# Patient Record
Sex: MALE | Race: WHITE | NOT HISPANIC OR LATINO | URBAN - METROPOLITAN AREA
[De-identification: names, ages, dates, MRNs, and addresses within clinical notes are randomized per-mention and may not be internally consistent; named-entity substitution may affect disease eponyms.]

---

## 2020-02-25 PROBLEM — 238131007 OVERWEIGHT: Status: ACTIVE | Noted: 2020-02-25

## 2022-04-30 ENCOUNTER — TELEPHONE ENCOUNTER (OUTPATIENT)
Dept: URBAN - METROPOLITAN AREA CLINIC 121 | Facility: CLINIC | Age: 72
End: 2022-04-30

## 2022-04-30 RX ORDER — ASPIRIN 81 MG
TABLET, DELAYED RELEASE (ENTERIC COATED) ORAL
OUTPATIENT
Start: 2009-04-03 | End: 2014-06-30

## 2022-04-30 RX ORDER — BUDESONIDE 3 MG/1
TAKE 3 CAPSULES PO WITH FOOD A AM CAPSULE, COATED PELLETS ORAL
OUTPATIENT
Start: 2012-09-25 | End: 2018-10-04

## 2022-04-30 RX ORDER — ASPIRIN 81 MG
TABLET, DELAYED RELEASE (ENTERIC COATED) ORAL
OUTPATIENT
Start: 2009-04-03

## 2022-04-30 RX ORDER — IRBESARTAN/HYDROCHLOROTHIAZIDE 300-12.5MG
TABLET ORAL
OUTPATIENT
Start: 2006-05-15 | End: 2014-06-30

## 2022-04-30 RX ORDER — OLMESARTAN MEDOXOMIL-HYDROCHLOROTHIAZIDE 25; 40 MG/1; MG/1
TABLET, FILM COATED ORAL
OUTPATIENT
Start: 2014-06-30

## 2022-04-30 RX ORDER — ESOMEPRAZOLE MAGNESIUM 20 MG
CAPSULE,DELAYED RELEASE (ENTERIC COATED) ORAL
OUTPATIENT
Start: 2016-12-05 | End: 2019-01-28

## 2022-04-30 RX ORDER — OLMESARTAN MEDOXOMIL-HYDROCHLOROTHIAZIDE 25; 40 MG/1; MG/1
TABLET, FILM COATED ORAL
OUTPATIENT
Start: 2014-06-30 | End: 2018-10-04

## 2022-04-30 RX ORDER — ZOLPIDEM TARTRATE 10 MG
TAKE 1/2- 1 TABLET AT BEDTIME AS NEEDED FOR SLEEP TABLET ORAL
OUTPATIENT
Start: 2012-11-19

## 2022-04-30 RX ORDER — ESOMEPRAZOLE MAGNESIUM 20 MG
CAPSULE,DELAYED RELEASE (ENTERIC COATED) ORAL
OUTPATIENT
Start: 2016-12-05

## 2022-04-30 RX ORDER — MESALAMINE 500 MG/1
1 CAP PO BID CAPSULE ORAL
OUTPATIENT
Start: 2013-07-25

## 2022-04-30 RX ORDER — BUDESONIDE 3 MG/1
TAKE 3 CAPSULES PO WITH FOOD A AM CAPSULE, COATED PELLETS ORAL
OUTPATIENT
Start: 2012-09-25

## 2022-04-30 RX ORDER — ZOLPIDEM TARTRATE 10 MG
TAKE 1/2- 1 TABLET AT BEDTIME AS NEEDED FOR SLEEP TABLET ORAL
OUTPATIENT
Start: 2012-11-19 | End: 2014-06-30

## 2022-04-30 RX ORDER — MESALAMINE 500 MG/1
TAKE 1 CAPSULE BY MOUTH TWICE A DAY CAPSULE ORAL
OUTPATIENT
Start: 2012-09-28

## 2022-04-30 RX ORDER — HYDROCHLOROTHIAZIDE 12.5 MG/1
CAPSULE ORAL
OUTPATIENT
Start: 2016-12-05

## 2022-04-30 RX ORDER — MESALAMINE 500 MG/1
TAKE 1 CAPSULE BY MOUTH TWICE A DAY CAPSULE ORAL
OUTPATIENT
Start: 2012-09-28 | End: 2016-12-05

## 2022-04-30 RX ORDER — IRBESARTAN/HYDROCHLOROTHIAZIDE 300-12.5MG
TABLET ORAL
OUTPATIENT
Start: 2006-05-15

## 2022-04-30 RX ORDER — HYDROCHLOROTHIAZIDE 12.5 MG/1
CAPSULE ORAL
OUTPATIENT
Start: 2016-12-05 | End: 2019-01-28

## 2022-04-30 RX ORDER — ALLOPURINOL 100 MG/1
TABLET ORAL
OUTPATIENT
Start: 2006-05-15

## 2022-04-30 RX ORDER — ALLOPURINOL 100 MG/1
TABLET ORAL
OUTPATIENT
Start: 2006-05-15 | End: 2014-06-30

## 2022-04-30 RX ORDER — BUDESONIDE 0.5 MG/2ML
INHALANT ORAL
OUTPATIENT
Start: 2018-10-04 | End: 2019-01-28

## 2022-04-30 RX ORDER — BUDESONIDE 0.5 MG/2ML
INHALANT ORAL
OUTPATIENT
Start: 2018-10-04

## 2022-04-30 RX ORDER — MESALAMINE 500 MG/1
1 CAP PO BID CAPSULE ORAL
OUTPATIENT
Start: 2013-07-25 | End: 2016-12-05

## 2022-04-30 RX ORDER — MESALAMINE 500 MG/1
TAKE 1 CAPSULE PO BID CAPSULE ORAL
OUTPATIENT
Start: 2015-01-06

## 2022-04-30 RX ORDER — MESALAMINE 500 MG/1
TAKE 1 CAPUSLE PO  TWICE A DAY CAPSULE ORAL
OUTPATIENT
Start: 2020-01-02

## 2022-05-01 ENCOUNTER — TELEPHONE ENCOUNTER (OUTPATIENT)
Dept: URBAN - METROPOLITAN AREA CLINIC 121 | Facility: CLINIC | Age: 72
End: 2022-05-01

## 2022-05-01 RX ORDER — GABAPENTIN 100 MG/1
CAPSULE ORAL
Status: ACTIVE | COMMUNITY
Start: 2014-06-30

## 2022-05-01 RX ORDER — METOPROLOL TARTRATE 100 MG/1
TABLET, FILM COATED ORAL
Status: ACTIVE | COMMUNITY
Start: 2014-06-30

## 2022-05-01 RX ORDER — BIFIDOBACTERIUM LONGUM 10MM CELL
CAPSULE ORAL
Status: ACTIVE | COMMUNITY
Start: 2016-12-05

## 2022-05-01 RX ORDER — MELATONIN/PYRIDOXINE HCL (B6) 5 MG-10 MG
TABLET,IMMED, EXTENDED RELEASE, BIPHASIC ORAL
Status: ACTIVE | COMMUNITY
Start: 2016-12-05

## 2022-05-01 RX ORDER — MESALAMINE 500 MG/1
TAKE 1 CAPUSLE PO  TWICE A DAY CAPSULE ORAL
Status: ACTIVE | COMMUNITY
Start: 2020-01-02

## 2022-05-01 RX ORDER — MESALAMINE 500 MG/1
TAKE 1 CAPSULE PO BID CAPSULE ORAL
Status: ACTIVE | COMMUNITY
Start: 2016-02-05

## 2022-05-01 RX ORDER — MULTIVIT-MIN/FA/LYCOPEN/LUTEIN .4-300-25
TABLET ORAL
Status: ACTIVE | COMMUNITY
Start: 2016-12-05

## 2022-05-01 RX ORDER — ALLOPURINOL 300 MG/1
TABLET ORAL
Status: ACTIVE | COMMUNITY
Start: 2009-03-20

## 2022-05-01 RX ORDER — ATORVASTATIN CALCIUM 10 MG/1
TABLET, FILM COATED ORAL
Status: ACTIVE | COMMUNITY
Start: 2018-10-04

## 2023-02-23 ENCOUNTER — LAB OUTSIDE AN ENCOUNTER (OUTPATIENT)
Dept: URBAN - METROPOLITAN AREA CLINIC 27 | Facility: CLINIC | Age: 73
End: 2023-02-23

## 2023-02-23 ENCOUNTER — TELEPHONE ENCOUNTER (OUTPATIENT)
Dept: URBAN - METROPOLITAN AREA CLINIC 27 | Facility: CLINIC | Age: 73
End: 2023-02-23

## 2023-02-23 ENCOUNTER — OFFICE VISIT (OUTPATIENT)
Dept: URBAN - METROPOLITAN AREA CLINIC 27 | Facility: CLINIC | Age: 73
End: 2023-02-23
Payer: MEDICARE

## 2023-02-23 VITALS
SYSTOLIC BLOOD PRESSURE: 95 MMHG | WEIGHT: 175 LBS | HEART RATE: 66 BPM | DIASTOLIC BLOOD PRESSURE: 59 MMHG | BODY MASS INDEX: 25.92 KG/M2 | HEIGHT: 69 IN

## 2023-02-23 DIAGNOSIS — K50.90 CROHN'S DISEASE, UNSPECIFIED, WITHOUT COMPLICATIONS: ICD-10-CM

## 2023-02-23 DIAGNOSIS — I10 ESSENTIAL (PRIMARY) HYPERTENSION: ICD-10-CM

## 2023-02-23 DIAGNOSIS — Z83.71 FAMILY HISTORY OF COLONIC POLYPS: ICD-10-CM

## 2023-02-23 DIAGNOSIS — Z86.010 PERSONAL HISTORY OF COLONIC POLYPS: ICD-10-CM

## 2023-02-23 PROBLEM — 429969003 FAMILY HISTORY OF POLYP OF COLON: Status: ACTIVE | Noted: 2020-02-25

## 2023-02-23 PROCEDURE — 99214 OFFICE O/P EST MOD 30 MIN: CPT | Performed by: INTERNAL MEDICINE

## 2023-02-23 PROCEDURE — 99244 OFF/OP CNSLTJ NEW/EST MOD 40: CPT | Performed by: INTERNAL MEDICINE

## 2023-02-23 RX ORDER — GABAPENTIN 100 MG/1
CAPSULE ORAL
Status: DISCONTINUED | COMMUNITY
Start: 2014-06-30

## 2023-02-23 RX ORDER — MESALAMINE 500 MG/1
TAKE 1 CAPUSLE PO  TWICE A DAY CAPSULE ORAL
Status: DISCONTINUED | COMMUNITY
Start: 2020-01-02

## 2023-02-23 RX ORDER — MULTIVIT-MIN/FA/LYCOPEN/LUTEIN .4-300-25
TABLET ORAL
Status: DISCONTINUED | COMMUNITY
Start: 2016-12-05

## 2023-02-23 RX ORDER — METOPROLOL TARTRATE 50 MG/1
1 TABLET WITH FOOD TABLET, FILM COATED ORAL
Status: ACTIVE | COMMUNITY
Start: 2014-06-30

## 2023-02-23 RX ORDER — BIFIDOBACTERIUM LONGUM 10MM CELL
CAPSULE ORAL
Status: ACTIVE | COMMUNITY
Start: 2016-12-05

## 2023-02-23 RX ORDER — ALLOPURINOL 300 MG/1
TABLET ORAL
Status: ACTIVE | COMMUNITY
Start: 2009-03-20

## 2023-02-23 RX ORDER — MELATONIN/PYRIDOXINE HCL (B6) 5 MG-10 MG
TABLET,IMMED, EXTENDED RELEASE, BIPHASIC ORAL
Status: ACTIVE | COMMUNITY
Start: 2016-12-05

## 2023-02-23 RX ORDER — ATORVASTATIN CALCIUM 20 MG/1
AS DIRECTED TABLET, FILM COATED ORAL
Status: ACTIVE | COMMUNITY
Start: 2018-10-04

## 2023-02-23 NOTE — HPI-SCREENING
This is a 72-year-old male seen in consultation a for his Crohn's disease.  He has been doing well and asymptomatic for many years.  He gets up in the morning with a formed bowel movement then a softer bowel movement and then he is good for the rest of the day.  He stopped his pates in 2020 and has had no symptoms.  He has a history of polyps and last colonoscopy March 2020.  He was formally followed by Dr. Beth.  In October he had a knee replacement at that time he lost 15 pounds and stopped drinking beer regularly and has been feeling very well since then.  He had recent blood work with his PCP that is not available today

## 2023-02-24 PROBLEM — 34000006 CROHN'S DISEASE: Status: ACTIVE | Noted: 2020-02-25

## 2023-03-23 ENCOUNTER — CLAIMS CREATED FROM THE CLAIM WINDOW (OUTPATIENT)
Dept: URBAN - METROPOLITAN AREA SURGERY CENTER 7 | Facility: SURGERY CENTER | Age: 73
End: 2023-03-23

## 2023-03-23 ENCOUNTER — CLAIMS CREATED FROM THE CLAIM WINDOW (OUTPATIENT)
Dept: URBAN - METROPOLITAN AREA CLINIC 4 | Facility: CLINIC | Age: 73
End: 2023-03-23
Payer: MEDICARE

## 2023-03-23 ENCOUNTER — CLAIMS CREATED FROM THE CLAIM WINDOW (OUTPATIENT)
Dept: URBAN - METROPOLITAN AREA SURGERY CENTER 7 | Facility: SURGERY CENTER | Age: 73
End: 2023-03-23
Payer: MEDICARE

## 2023-03-23 ENCOUNTER — LAB OUTSIDE AN ENCOUNTER (OUTPATIENT)
Dept: URBAN - METROPOLITAN AREA CLINIC 27 | Facility: CLINIC | Age: 73
End: 2023-03-23

## 2023-03-23 ENCOUNTER — WEB ENCOUNTER (OUTPATIENT)
Dept: URBAN - METROPOLITAN AREA CLINIC 27 | Facility: CLINIC | Age: 73
End: 2023-03-23

## 2023-03-23 DIAGNOSIS — K50.10 CC (CROHN'S COLITIS): ICD-10-CM

## 2023-03-23 DIAGNOSIS — K63.89 APPENDICITIS EPIPLOICA: ICD-10-CM

## 2023-03-23 DIAGNOSIS — K63.89 OTHER SPECIFIED DISEASES OF INTESTINE: ICD-10-CM

## 2023-03-23 PROCEDURE — G8907 PT DOC NO EVENTS ON DISCHARG: HCPCS | Performed by: CLINIC/CENTER

## 2023-03-23 PROCEDURE — G8907 PT DOC NO EVENTS ON DISCHARG: HCPCS | Performed by: INTERNAL MEDICINE

## 2023-03-23 PROCEDURE — 45380 COLONOSCOPY AND BIOPSY: CPT | Performed by: INTERNAL MEDICINE

## 2023-03-23 PROCEDURE — 45380 COLONOSCOPY AND BIOPSY: CPT | Performed by: CLINIC/CENTER

## 2023-03-23 PROCEDURE — 88305 TISSUE EXAM BY PATHOLOGIST: CPT | Performed by: PATHOLOGY

## 2023-03-23 RX ORDER — METOPROLOL TARTRATE 50 MG/1
1 TABLET WITH FOOD TABLET, FILM COATED ORAL
Status: ACTIVE | COMMUNITY
Start: 2014-06-30

## 2023-03-23 RX ORDER — ATORVASTATIN CALCIUM 20 MG/1
AS DIRECTED TABLET, FILM COATED ORAL
Status: ACTIVE | COMMUNITY
Start: 2018-10-04

## 2023-03-23 RX ORDER — ALLOPURINOL 300 MG/1
TABLET ORAL
Status: ACTIVE | COMMUNITY
Start: 2009-03-20

## 2023-03-23 RX ORDER — MELATONIN/PYRIDOXINE HCL (B6) 5 MG-10 MG
TABLET,IMMED, EXTENDED RELEASE, BIPHASIC ORAL
Status: ACTIVE | COMMUNITY
Start: 2016-12-05

## 2023-03-23 RX ORDER — BIFIDOBACTERIUM LONGUM 10MM CELL
CAPSULE ORAL
Status: ACTIVE | COMMUNITY
Start: 2016-12-05

## 2023-03-24 ENCOUNTER — TELEPHONE ENCOUNTER (OUTPATIENT)
Dept: URBAN - METROPOLITAN AREA CLINIC 27 | Facility: CLINIC | Age: 73
End: 2023-03-24

## 2023-03-24 LAB
FOLATE (FOLIC ACID), SERUM: 15.6
VITAMIN B12: 1768

## 2023-03-28 ENCOUNTER — TELEPHONE ENCOUNTER (OUTPATIENT)
Dept: URBAN - METROPOLITAN AREA CLINIC 27 | Facility: CLINIC | Age: 73
End: 2023-03-28

## 2023-06-19 ENCOUNTER — OUT OF OFFICE VISIT (OUTPATIENT)
Dept: URBAN - METROPOLITAN AREA MEDICAL CENTER 10 | Facility: MEDICAL CENTER | Age: 73
End: 2023-06-19
Payer: MEDICARE

## 2023-06-19 DIAGNOSIS — R63.0 ALMOST NO APPETITE: ICD-10-CM

## 2023-06-19 DIAGNOSIS — R10.33 ABDOMINAL PAIN, ACUTE, PERIUMBILICAL: ICD-10-CM

## 2023-06-19 DIAGNOSIS — D72.829 LEUKOCYTOSIS: ICD-10-CM

## 2023-06-19 DIAGNOSIS — R11.2 ACUTE NAUSEA WITH NONBILIOUS VOMITING: ICD-10-CM

## 2023-06-19 PROCEDURE — 99232 SBSQ HOSP IP/OBS MODERATE 35: CPT | Performed by: PHYSICIAN ASSISTANT

## 2023-06-19 PROCEDURE — 99222 1ST HOSP IP/OBS MODERATE 55: CPT | Performed by: PHYSICIAN ASSISTANT

## 2023-06-19 PROCEDURE — G8427 DOCREV CUR MEDS BY ELIG CLIN: HCPCS | Performed by: PHYSICIAN ASSISTANT

## 2023-06-27 ENCOUNTER — TELEPHONE ENCOUNTER (OUTPATIENT)
Dept: URBAN - METROPOLITAN AREA CLINIC 78 | Facility: CLINIC | Age: 73
End: 2023-06-27

## 2023-06-27 RX ORDER — MESALAMINE 500 MG/1
2 CAPSULES CAPSULE ORAL
Qty: 120 CAPSULE | Refills: 3 | OUTPATIENT
Start: 2023-06-27 | End: 2023-10-25

## 2023-08-07 ENCOUNTER — OFFICE VISIT (OUTPATIENT)
Dept: URBAN - METROPOLITAN AREA CLINIC 78 | Facility: CLINIC | Age: 73
End: 2023-08-07
Payer: MEDICARE

## 2023-08-07 VITALS
RESPIRATION RATE: 15 BRPM | SYSTOLIC BLOOD PRESSURE: 102 MMHG | HEIGHT: 69 IN | TEMPERATURE: 98.2 F | WEIGHT: 182 LBS | HEART RATE: 59 BPM | BODY MASS INDEX: 26.96 KG/M2 | DIASTOLIC BLOOD PRESSURE: 64 MMHG

## 2023-08-07 DIAGNOSIS — R93.5 ABNORMAL CT OF THE ABDOMEN: ICD-10-CM

## 2023-08-07 DIAGNOSIS — Z86.010 PERSONAL HISTORY OF COLONIC POLYPS: ICD-10-CM

## 2023-08-07 DIAGNOSIS — Z83.71 FAMILY HISTORY OF COLONIC POLYPS: ICD-10-CM

## 2023-08-07 DIAGNOSIS — K50.90 CROHN'S DISEASE, UNSPECIFIED, WITHOUT COMPLICATIONS: ICD-10-CM

## 2023-08-07 DIAGNOSIS — I73.9 PERIPHERAL VASCULAR DISEASE: ICD-10-CM

## 2023-08-07 DIAGNOSIS — I10 ESSENTIAL (PRIMARY) HYPERTENSION: ICD-10-CM

## 2023-08-07 PROBLEM — 400047006: Status: ACTIVE | Noted: 2023-08-07

## 2023-08-07 PROCEDURE — 99214 OFFICE O/P EST MOD 30 MIN: CPT | Performed by: INTERNAL MEDICINE

## 2023-08-07 RX ORDER — BIFIDOBACTERIUM LONGUM 10MM CELL
CAPSULE ORAL
Status: ON HOLD | COMMUNITY
Start: 2016-12-05

## 2023-08-07 RX ORDER — METOPROLOL TARTRATE 50 MG/1
1 TABLET WITH FOOD TABLET, FILM COATED ORAL
Status: ACTIVE | COMMUNITY
Start: 2014-06-30

## 2023-08-07 RX ORDER — MELATONIN/PYRIDOXINE HCL (B6) 5 MG-10 MG
TABLET,IMMED, EXTENDED RELEASE, BIPHASIC ORAL
Status: ACTIVE | COMMUNITY
Start: 2016-12-05

## 2023-08-07 RX ORDER — AMLODIPINE BESYLATE 2.5 MG/1
1 TABLET TABLET ORAL ONCE A DAY
Status: ACTIVE | COMMUNITY

## 2023-08-07 RX ORDER — CLOPIDOGREL 75 MG/1
1 TABLET TABLET, FILM COATED ORAL ONCE A DAY
Status: ACTIVE | COMMUNITY

## 2023-08-07 RX ORDER — MESALAMINE 500 MG/1
2 CAPSULES CAPSULE ORAL
Qty: 120 CAPSULE | Refills: 3 | Status: ON HOLD | COMMUNITY
Start: 2023-06-27 | End: 2023-10-25

## 2023-08-07 RX ORDER — ALLOPURINOL 300 MG/1
TABLET ORAL
Status: ON HOLD | COMMUNITY
Start: 2009-03-20

## 2023-08-07 RX ORDER — MESALAMINE 500 MG/1
1 CAPSULE CAPSULE ORAL TWICE A DAY
Qty: 60 CAPSULE | Refills: 3 | OUTPATIENT
Start: 2023-08-07 | End: 2023-12-04

## 2023-08-07 RX ORDER — ATORVASTATIN CALCIUM 20 MG/1
AS DIRECTED TABLET, FILM COATED ORAL
Status: ACTIVE | COMMUNITY
Start: 2018-10-04

## 2023-08-07 RX ORDER — POLYETHYLENE GLYCOL 3350, SODIUM SULFATE ANHYDROUS, SODIUM BICARBONATE, SODIUM CHLORIDE, POTASSIUM CHLORIDE 236; 22.74; 6.74; 5.86; 2.97 G/4L; G/4L; G/4L; G/4L; G/4L
AS DIRECTED POWDER, FOR SOLUTION ORAL AS DIRECTED
Refills: 0 | OUTPATIENT
Start: 2023-08-07 | End: 2023-08-08

## 2023-08-07 RX ORDER — PREGABALIN 100 MG/1
1 CAPSULE CAPSULE ORAL TWICE A DAY
Status: ACTIVE | COMMUNITY

## 2023-08-07 NOTE — HPI-SCREENING
I met the patient during an inpt stay at [facility] in June 2023. He has a history of Crohn's disease, previously  followed by Dr. Beth. He had been on Pentasa and budesonide but  was taken off of therapy 4 years ago when colonoscopy showed resolution of inflammation in cecum/TI.   He had in fact been doing well and asymptomatic for many years but was admitted with abdominal pain and suggestion of SBO.   He has a PMHx of HTN (metoprolol +amlodipine), HLD (lipitor 20 mg), gout (allopurinol 300mg), vascular disease (ASA + plavix).   Upon discharge I placed him back on Pentasa 1000 mg twice daily (although unfortunately he tells me it is not covered by his insurance) as CT of the abdomen showed terminal ileitis with upstream dilatation of the bowels and MRI A/P showed multiple areas of bowel wall thickening predominating over long segment of the distal ileum findings with underlying element of fibrosis.  I had also advised to f/u with CRS due to possible stricture .He saw Dr. Brown in follow up who did not advise for any surgical intervention at this time.   He has a history of polyps and last colonoscopy March 2020.     Denies uveitis, skin changes, arthritis, myositis.    He tells me today he just finished the steroid taper 3 days ago, has been asymptomatic since then. Having one soft formed bowel movement per day in the morning, no melena or hematochezia, abdominal pain, N/V.    No cardiac disease. No history of blood clots.   He and his wife are scheduled for cataract surgery in the coming weeks.   *He prefers to undergo colonoscopy with COLYTE.   Summary of prior workup: - Labs on 6/19/23 showed WBC 14.9, Hb 16.9 (felt to be due to hemoconcentration) - CT A/P on June 2023: Multiple areas of wall thickening in the distal ileum, possibly infectious or inflammatory in etiology. Upstream small bowel dilation suggests associated partial small bowel obstruction or ileus. - Colonoscopy by Dr. Nikolay Faith on 3/23/2023: 5 mm HP polyp in the transverse.  Internal hemorrhoids. Biopsies showed quiescent IBD, no dysplasia.  Poor prep.  Repeat colonoscopy advised in 1 year. -Colonoscopy by Dr. Ronni Wilder on 6/13/2018: Normal colonoscopy to the TI. Biopsies showed preserved villous architecture. -Colonoscopy  on 3/29/2016: Normal to the TI. -Colonoscopy in 2014: Normal colon. TI biopsies disclosed ulcerative acute and chronic ieitis.  -Colonoscopy in 2012 while on Pentasa and daily budesonide 9 mg: Active Crohn's noted with multiple ulcerations measuring 2 to 10 mm in diameter scattered throughout the examined ileum with associated erythema and edema, no evidence of a stricture.  No neoplasm. Acute and chronic ieitis wit pseudopyloric metaplasia on biopsies.  -Colonoscopy on 2009: 3 mm shallow ulcer in the ileocecal valve.  Ileitis. - Small bowel series in 2009: Crohn's disease involving the distal ileum and possibly the cecum.  No evidence of bowel obstruction. -Colonoscopy in 2006 showed 2 sigmoid polyps

## 2023-08-11 ENCOUNTER — WEB ENCOUNTER (OUTPATIENT)
Dept: URBAN - METROPOLITAN AREA CLINIC 78 | Facility: CLINIC | Age: 73
End: 2023-08-11

## 2023-09-20 ENCOUNTER — OFFICE VISIT (OUTPATIENT)
Dept: URBAN - METROPOLITAN AREA SURGERY CENTER 15 | Facility: SURGERY CENTER | Age: 73
End: 2023-09-20
Payer: MEDICARE

## 2023-09-20 ENCOUNTER — CLAIMS CREATED FROM THE CLAIM WINDOW (OUTPATIENT)
Dept: URBAN - METROPOLITAN AREA CLINIC 4 | Facility: CLINIC | Age: 73
End: 2023-09-20
Payer: MEDICARE

## 2023-09-20 DIAGNOSIS — K50.812 CROHN'S DISEASE OF BOTH SMALL AND LARGE INTESTINE WITH INTESTINAL OBSTRUCTION: ICD-10-CM

## 2023-09-20 DIAGNOSIS — K50.80 CROHN'S DISEASE OF BOTH SMALL AND LARGE INTESTINE: ICD-10-CM

## 2023-09-20 DIAGNOSIS — K64.0 INTERNAL HEMORRHOIDS GRADE I: ICD-10-CM

## 2023-09-20 DIAGNOSIS — R93.3 ABNORMAL CT SCAN, GASTROINTESTINAL TRACT: ICD-10-CM

## 2023-09-20 DIAGNOSIS — K52.89 OTHER SPECIFIED NONINFECTIVE GASTROENTERITIS AND COLITIS: ICD-10-CM

## 2023-09-20 DIAGNOSIS — K56.699 STENOSIS OF ILEUM: ICD-10-CM

## 2023-09-20 PROCEDURE — 00811 ANES LWR INTST NDSC NOS: CPT | Performed by: NURSE ANESTHETIST, CERTIFIED REGISTERED

## 2023-09-20 PROCEDURE — 45380 COLONOSCOPY AND BIOPSY: CPT | Performed by: INTERNAL MEDICINE

## 2023-09-20 PROCEDURE — G8907 PT DOC NO EVENTS ON DISCHARG: HCPCS | Performed by: INTERNAL MEDICINE

## 2023-09-20 PROCEDURE — 88305 TISSUE EXAM BY PATHOLOGIST: CPT | Performed by: PATHOLOGY

## 2023-09-20 RX ORDER — ATORVASTATIN CALCIUM 20 MG/1
AS DIRECTED TABLET, FILM COATED ORAL
Status: ACTIVE | COMMUNITY
Start: 2018-10-04

## 2023-09-20 RX ORDER — ALLOPURINOL 300 MG/1
TABLET ORAL
Status: ON HOLD | COMMUNITY
Start: 2009-03-20

## 2023-09-20 RX ORDER — METOPROLOL TARTRATE 50 MG/1
1 TABLET WITH FOOD TABLET, FILM COATED ORAL
Status: ACTIVE | COMMUNITY
Start: 2014-06-30

## 2023-09-20 RX ORDER — MELATONIN/PYRIDOXINE HCL (B6) 5 MG-10 MG
TABLET,IMMED, EXTENDED RELEASE, BIPHASIC ORAL
Status: ACTIVE | COMMUNITY
Start: 2016-12-05

## 2023-09-20 RX ORDER — PREGABALIN 100 MG/1
1 CAPSULE CAPSULE ORAL TWICE A DAY
Status: ACTIVE | COMMUNITY

## 2023-09-20 RX ORDER — BIFIDOBACTERIUM LONGUM 10MM CELL
CAPSULE ORAL
Status: ON HOLD | COMMUNITY
Start: 2016-12-05

## 2023-09-20 RX ORDER — MESALAMINE 500 MG/1
2 CAPSULES CAPSULE ORAL
Qty: 120 CAPSULE | Refills: 3 | Status: ON HOLD | COMMUNITY
Start: 2023-06-27 | End: 2023-10-25

## 2023-09-20 RX ORDER — MESALAMINE 500 MG/1
1 CAPSULE CAPSULE ORAL TWICE A DAY
Qty: 60 CAPSULE | Refills: 3 | Status: ACTIVE | COMMUNITY
Start: 2023-08-07 | End: 2023-12-04

## 2023-09-20 RX ORDER — CLOPIDOGREL 75 MG/1
1 TABLET TABLET, FILM COATED ORAL ONCE A DAY
Status: ACTIVE | COMMUNITY

## 2023-09-20 RX ORDER — AMLODIPINE BESYLATE 2.5 MG/1
1 TABLET TABLET ORAL ONCE A DAY
Status: ACTIVE | COMMUNITY

## 2024-01-04 ENCOUNTER — DASHBOARD ENCOUNTERS (OUTPATIENT)
Age: 74
End: 2024-01-04

## 2024-01-04 ENCOUNTER — OFFICE VISIT (OUTPATIENT)
Dept: URBAN - METROPOLITAN AREA CLINIC 78 | Facility: CLINIC | Age: 74
End: 2024-01-04
Payer: MEDICARE

## 2024-01-04 VITALS
SYSTOLIC BLOOD PRESSURE: 108 MMHG | HEIGHT: 69 IN | TEMPERATURE: 97.9 F | BODY MASS INDEX: 27.99 KG/M2 | HEART RATE: 53 BPM | WEIGHT: 189 LBS | DIASTOLIC BLOOD PRESSURE: 66 MMHG

## 2024-01-04 DIAGNOSIS — R93.5 ABNORMAL CT OF THE ABDOMEN: ICD-10-CM

## 2024-01-04 DIAGNOSIS — I73.9 PERIPHERAL VASCULAR DISEASE: ICD-10-CM

## 2024-01-04 DIAGNOSIS — K50.80 CROHN'S COLITIS: ICD-10-CM

## 2024-01-04 PROCEDURE — 99214 OFFICE O/P EST MOD 30 MIN: CPT | Performed by: INTERNAL MEDICINE

## 2024-01-04 RX ORDER — AMLODIPINE BESYLATE 2.5 MG/1
1 TABLET TABLET ORAL ONCE A DAY
Status: ACTIVE | COMMUNITY

## 2024-01-04 RX ORDER — PREGABALIN 100 MG/1
1 CAPSULE CAPSULE ORAL TWICE A DAY
Status: ACTIVE | COMMUNITY

## 2024-01-04 RX ORDER — METOPROLOL TARTRATE 50 MG/1
1 TABLET WITH FOOD TABLET, FILM COATED ORAL
Status: ACTIVE | COMMUNITY
Start: 2014-06-30

## 2024-01-04 RX ORDER — BIFIDOBACTERIUM LONGUM 10MM CELL
CAPSULE ORAL
Status: ON HOLD | COMMUNITY
Start: 2016-12-05

## 2024-01-04 RX ORDER — CLOPIDOGREL 75 MG/1
1 TABLET TABLET, FILM COATED ORAL ONCE A DAY
Status: ACTIVE | COMMUNITY

## 2024-01-04 RX ORDER — ATORVASTATIN CALCIUM 20 MG/1
AS DIRECTED TABLET, FILM COATED ORAL
Status: ACTIVE | COMMUNITY
Start: 2018-10-04

## 2024-01-04 RX ORDER — ALLOPURINOL 300 MG/1
TABLET ORAL
Status: ON HOLD | COMMUNITY
Start: 2009-03-20

## 2024-01-04 RX ORDER — MELATONIN/PYRIDOXINE HCL (B6) 5 MG-10 MG
TABLET,IMMED, EXTENDED RELEASE, BIPHASIC ORAL
Status: ACTIVE | COMMUNITY
Start: 2016-12-05

## 2024-01-04 NOTE — HPI-SCREENING
I met the patient during an inpt stay at [facility] in June 2023. He has a history of Crohn's disease, previously  followed by Dr. Beth. He had been on Pentasa and budesonide but  was taken off of therapy 4 years ago when colonoscopy showed resolution of inflammation in cecum/TI.   He had in fact been doing well and asymptomatic for many years but was admitted with abdominal pain and suggestion of SBO.   He has a PMHx of HTN (metoprolol +amlodipine), HLD (lipitor 20 mg), gout (allopurinol 300mg), vascular disease (ASA + plavix).   Upon discharge I placed him back on Pentasa 1000 mg twice daily (although unfortunately he tells me it is not covered by his insurance) as CT of the abdomen showed terminal ileitis with upstream dilatation of the bowels and MRI A/P showed multiple areas of bowel wall thickening predominating over long segment of the distal ileum findings with underlying element of fibrosis. I had also advised to f/u with CRS due to possible stricture .He saw Dr. Brown in follow up who did not advise for any surgical intervention at this time.   He is taking Mesalamine 2.4 grams instead due to the cost of Pentasa.  Today we reviewed the results of his recent colonoscopy and path. He is doing better since I was able to dilate mildly the TI with the scope.  He tells me he has been feeling great. Denies abdominal pain or rectal bleeding. He has good energy.  He will take Imodium prn for occasional loose stools here and there.  No melena or hematochezia, anorexia, unintentional weight loss, N/V. He will take Nexium once in a blue moon for heartbur. No dysphagia.  He has been using Ibuprofen about 3 times a week to help him sleep better.  He has a history of polyps.     Denies uveitis, skin changes, arthritis, myositis.    No cardiac disease. No history of blood clots.   *He prefers to undergo colonoscopy with COLYTE.   Summary of prior workup: - Colonoscopy by me on 9/2023: Mild stenosis of the TI dilated upon passage of the scope. TI biopsies confirmed ileitis. Normal colon mucosa, random biopsies unremarakble.  - Labs on 6/19/23 showed WBC 14.9, Hb 16.9 (felt to be due to hemoconcentration) - CT A/P on June 2023: Multiple areas of wall thickening in the distal ileum, possibly infectious or inflammatory in etiology. Upstream small bowel dilation suggests associated partial small bowel obstruction or ileus. - Colonoscopy by Dr. Nikolay Faith on 3/23/2023: 5 mm HP polyp in the transverse.  Internal hemorrhoids. Biopsies showed quiescent IBD, no dysplasia.  Poor prep.  Repeat colonoscopy advised in 1 year. -Colonoscopy by Dr. Ronni Wilder on 6/13/2018: Normal colonoscopy to the TI. Biopsies showed preserved villous architecture. -Colonoscopy  on 3/29/2016: Normal to the TI. -Colonoscopy in 2014: Normal colon. TI biopsies disclosed ulcerative acute and chronic ieitis.  -Colonoscopy in 2012 while on Pentasa and daily budesonide 9 mg: Active Crohn's noted with multiple ulcerations measuring 2 to 10 mm in diameter scattered throughout the examined ileum with associated erythema and edema, no evidence of a stricture.  No neoplasm. Acute and chronic ieitis wit pseudopyloric metaplasia on biopsies.  -Colonoscopy on 2009: 3 mm shallow ulcer in the ileocecal valve.  Ileitis. - Small bowel series in 2009: Crohn's disease involving the distal ileum and possibly the cecum.  No evidence of bowel obstruction. -Colonoscopy in 2006 showed 2 sigmoid polyps

## 2024-05-24 ENCOUNTER — TELEPHONE ENCOUNTER (OUTPATIENT)
Dept: URBAN - METROPOLITAN AREA CLINIC 78 | Facility: CLINIC | Age: 74
End: 2024-05-24

## 2024-05-24 RX ORDER — MESALAMINE 500 MG/1
1 CAPSULE CAPSULE ORAL TWICE A DAY
Qty: 60 CAPSULE | Refills: 3
Start: 2023-08-07 | End: 2024-09-21

## 2024-10-10 ENCOUNTER — OFFICE VISIT (OUTPATIENT)
Dept: URBAN - METROPOLITAN AREA CLINIC 78 | Facility: CLINIC | Age: 74
End: 2024-10-10
Payer: MEDICARE

## 2024-10-10 VITALS
BODY MASS INDEX: 28.14 KG/M2 | RESPIRATION RATE: 16 BRPM | DIASTOLIC BLOOD PRESSURE: 65 MMHG | TEMPERATURE: 98 F | HEART RATE: 66 BPM | HEIGHT: 69 IN | SYSTOLIC BLOOD PRESSURE: 100 MMHG | WEIGHT: 190 LBS

## 2024-10-10 DIAGNOSIS — I73.9 PERIPHERAL VASCULAR DISEASE: ICD-10-CM

## 2024-10-10 DIAGNOSIS — Z86.0101 H/O ADENOMATOUS POLYP OF COLON: ICD-10-CM

## 2024-10-10 DIAGNOSIS — Z83.719 FAMILY HISTORY OF COLON POLYPS, UNSPECIFIED: ICD-10-CM

## 2024-10-10 DIAGNOSIS — K50.00 CROHN''S DISEASE OF ILEUM WITHOUT COMPLICATION: ICD-10-CM

## 2024-10-10 DIAGNOSIS — I10 ESSENTIAL (PRIMARY) HYPERTENSION: ICD-10-CM

## 2024-10-10 PROCEDURE — 99214 OFFICE O/P EST MOD 30 MIN: CPT

## 2024-10-10 RX ORDER — MELATONIN/PYRIDOXINE HCL (B6) 5 MG-10 MG
TABLET,IMMED, EXTENDED RELEASE, BIPHASIC ORAL
Status: ACTIVE | COMMUNITY
Start: 2016-12-05

## 2024-10-10 RX ORDER — BIFIDOBACTERIUM LONGUM 10MM CELL
CAPSULE ORAL
Status: ON HOLD | COMMUNITY
Start: 2016-12-05

## 2024-10-10 RX ORDER — METOPROLOL TARTRATE 50 MG/1
1 TABLET WITH FOOD TABLET, FILM COATED ORAL
Status: ACTIVE | COMMUNITY
Start: 2014-06-30

## 2024-10-10 RX ORDER — POLYETHYLENE GLYCOL 3350, SODIUM SULFATE ANHYDROUS, SODIUM BICARBONATE, SODIUM CHLORIDE, POTASSIUM CHLORIDE 236; 22.74; 6.74; 5.86; 2.97 G/4L; G/4L; G/4L; G/4L; G/4L
4000ML POWDER, FOR SOLUTION ORAL
Qty: 1 | Refills: 0 | OUTPATIENT
Start: 2024-10-10 | End: 2024-10-11

## 2024-10-10 RX ORDER — CLOPIDOGREL 75 MG/1
1 TABLET TABLET, FILM COATED ORAL ONCE A DAY
Status: ACTIVE | COMMUNITY

## 2024-10-10 RX ORDER — AMLODIPINE BESYLATE 2.5 MG/1
1 TABLET TABLET ORAL ONCE A DAY
Status: ACTIVE | COMMUNITY

## 2024-10-10 RX ORDER — ALLOPURINOL 300 MG/1
TABLET ORAL
Status: ON HOLD | COMMUNITY
Start: 2009-03-20

## 2024-10-10 RX ORDER — ATORVASTATIN CALCIUM 20 MG/1
AS DIRECTED TABLET, FILM COATED ORAL
Status: ACTIVE | COMMUNITY
Start: 2018-10-04

## 2024-10-10 RX ORDER — PREGABALIN 100 MG/1
1 CAPSULE CAPSULE ORAL TWICE A DAY
Status: ACTIVE | COMMUNITY

## 2024-10-10 NOTE — HPI-TODAY'S VISIT:
74-year-old male, established patient of Dr. Jean, last seen in January 2024 for continued care on Crohns disease which was previously followed by Dr. Beth.  He had previously been on Pentasa and budesonide however was taken off therapy 4 years ago as colonoscopyat the time  showed resolution of inflammation cecum/TI.  He was seen at [facility] in June 2023 by Dr. Jean for abdominal pain with suggestion of small bowel obstruction.  She placed him on Pentasa 1000 mg twice daily however insurance did not cover this medication for him outpatient.  CT at the time showed terminal ileitis with upstream dilation of the bowels and MRI A/P showed multiple areas of bowel wall thickening predominating over a long segment of distal ileum findings with underlying element of fibrosis. She advised him to follow-up with colorectal surgery due to possible stricture in which she saw Dr. Brown who did not advise for any surgical intervention at the time.   At the time of prior appointment he was on mesalamine 2.4 g instead of Pentasa.  He presented for follow-up after colonoscopy with TI dilation.  He reports symptom improvement since dilation and at the time denied abdominal pain, rectal bleeding.  He was noted to take Imodium as needed for occasional loose stools but denied melena, hematochezia, anorexia, unintentional weight loss, nausea or vomiting.  He would also take Nexium as needed for heartburn but denied dysphagia.   Today he presents for continued care on Crohns dz.  He only takes nexium rarely, no N/V/GERD/dysphagia, abn weight loss.  He denies vision changes, muscles pian, joint pain, or mucosal ulcers.  He denies abdominal pain. He says that if he eats something he is not supposed to, he may have diarrhea.. Sometimes in the morning he may have 3 BMs consisting of  diarrhea. He never sees blood/mucus. He does have a Bm Everyday. Drinking beer can trigger diarrhea. He says that this occurs if he drinks 7-8 beers over 2-3 days. He does take an imodium afther he drinks beers. He has had episodes of incontinence in the past year when he has had beer.  This has only happened about 4-5x in his life, and only occurs when he drinks alcohol.  He denies nocturnal symptoms. He denies fevers or chills.  He takes 4 capsules of metamucil per day with the mesalamine 600mg BID.   He is still on plavix and aspirin, rx by his cardiologist, which he only sees annually, Dr. Otis Farrell He denies CP/SOB. He does not see a pulmonologist.  Summary of prior workup: - Colonoscopy by me on 9/2023: Mild stenosis of the TI dilated upon passage of the scope. TI biopsies confirmed ileitis. Normal colon mucosa, random biopsies unremarakble.  - Labs on 6/19/23 showed WBC 14.9, Hb 16.9 (felt to be due to hemoconcentration) - CT A/P on June 2023: Multiple areas of wall thickening in the distal ileum, possibly infectious or inflammatory in etiology. Upstream small bowel dilation suggests associated partial small bowel obstruction or ileus. - Colonoscopy by Dr. Nikolay Faith on 3/23/2023: 5 mm HP polyp in the transverse. Internal hemorrhoids. Biopsies showed quiescent IBD, no dysplasia. Poor prep. Repeat colonoscopy advised in 1 year. -Colonoscopy by Dr. Ronni Wilder on 6/13/2018: Normal colonoscopy to the TI. Biopsies showed preserved villous architecture. -Colonoscopy on 3/29/2016: Normal to the TI. -Colonoscopy in 2014: Normal colon. TI biopsies disclosed ulcerative acute and chronic ieitis. -Colonoscopy in 2012 while on Pentasa and daily budesonide 9 mg: Active Crohn's noted with multiple ulcerations measuring 2 to 10 mm in diameter scattered throughout the examined ileum with associated erythema and edema, no evidence of a stricture. No neoplasm. Acute and chronic ieitis wit pseudopyloric metaplasia on biopsies. -Colonoscopy on 2009: 3 mm shallow ulcer in the ileocecal valve. Ileitis. - Small bowel series in 2009: Crohn's disease involving the distal ileum and possibly the cecum. No evidence of bowel obstruction. -Colonoscopy in 2006 showed 2 sigmoid polyps.

## 2024-10-11 ENCOUNTER — ERX REFILL RESPONSE (OUTPATIENT)
Dept: URBAN - METROPOLITAN AREA CLINIC 78 | Facility: CLINIC | Age: 74
End: 2024-10-11

## 2024-10-11 RX ORDER — POLYETHYLENE GLYCOL 3350, SODIUM SULFATE ANHYDROUS, SODIUM BICARBONATE, SODIUM CHLORIDE, POTASSIUM CHLORIDE 236; 22.74; 6.74; 5.86; 2.97 G/4L; G/4L; G/4L; G/4L; G/4L
4000ML POWDER, FOR SOLUTION ORAL
Qty: 1 | Refills: 0 | OUTPATIENT

## 2024-10-11 RX ORDER — POLYETHYLENE GLYCOL-3350 AND ELECTROLYTES WITH FLAVOR PACK 240; 5.84; 2.98; 6.72; 22.72 G/278.26G; G/278.26G; G/278.26G; G/278.26G; G/278.26G
4000 ML POWDER, FOR SOLUTION ORAL ONCE
Qty: 1 | Refills: 0 | OUTPATIENT

## 2024-10-14 LAB
A/G RATIO: 1.7
ABSOLUTE BASOPHILS: 38
ABSOLUTE EOSINOPHILS: 53
ABSOLUTE LYMPHOCYTES: 1088
ABSOLUTE MONOCYTES: 878
ABSOLUTE NEUTROPHILS: 5445
ALBUMIN: 3.8
ALKALINE PHOSPHATASE: 71
ALT (SGPT): 17
AST (SGOT): 21
BASOPHILS: 0.5
BILIRUBIN, TOTAL: 0.5
BUN/CREATININE RATIO: 19
BUN: 25
C-REACTIVE PROTEIN, QUANT: 3.1
CALCIUM: 9.2
CARBON DIOXIDE, TOTAL: 22
CHLORIDE: 105
CREATININE: 1.32
EGFR: 56
EOSINOPHILS: 0.7
FOLATE, SERUM: 15.6
GLOBULIN, TOTAL: 2.3
GLUCOSE: 130
HEMATOCRIT: 50.7
HEMOGLOBIN: 16.5
LYMPHOCYTES: 14.5
MCH: 31.1
MCHC: 32.5
MCV: 95.7
MONOCYTES: 11.7
MPV: 10.4
NEUTROPHILS: 72.6
PLATELET COUNT: 217
POTASSIUM: 4.1
PROTEIN, TOTAL: 6.1
RDW: 13.6
RED BLOOD CELL COUNT: 5.3
SODIUM: 137
VITAMIN B12: 1606
WHITE BLOOD CELL COUNT: 7.5

## 2024-10-17 ENCOUNTER — TELEPHONE ENCOUNTER (OUTPATIENT)
Dept: URBAN - METROPOLITAN AREA CLINIC 78 | Facility: CLINIC | Age: 74
End: 2024-10-17

## 2024-11-27 ENCOUNTER — OFFICE VISIT (OUTPATIENT)
Dept: URBAN - METROPOLITAN AREA SURGERY CENTER 15 | Facility: SURGERY CENTER | Age: 74
End: 2024-11-27

## 2024-11-27 RX ORDER — ALLOPURINOL 300 MG/1
TABLET ORAL
Status: ON HOLD | COMMUNITY
Start: 2009-03-20

## 2024-11-27 RX ORDER — POLYETHYLENE GLYCOL-3350 AND ELECTROLYTES WITH FLAVOR PACK 240; 5.84; 2.98; 6.72; 22.72 G/278.26G; G/278.26G; G/278.26G; G/278.26G; G/278.26G
4000 ML POWDER, FOR SOLUTION ORAL ONCE
Qty: 1 | Refills: 0 | Status: ACTIVE | COMMUNITY

## 2024-11-27 RX ORDER — CLOPIDOGREL 75 MG/1
1 TABLET TABLET, FILM COATED ORAL ONCE A DAY
Status: ACTIVE | COMMUNITY

## 2024-11-27 RX ORDER — BIFIDOBACTERIUM LONGUM 10MM CELL
CAPSULE ORAL
Status: ON HOLD | COMMUNITY
Start: 2016-12-05

## 2024-11-27 RX ORDER — MELATONIN/PYRIDOXINE HCL (B6) 5 MG-10 MG
TABLET,IMMED, EXTENDED RELEASE, BIPHASIC ORAL
Status: ACTIVE | COMMUNITY
Start: 2016-12-05

## 2024-11-27 RX ORDER — ATORVASTATIN CALCIUM 20 MG/1
AS DIRECTED TABLET, FILM COATED ORAL
Status: ACTIVE | COMMUNITY
Start: 2018-10-04

## 2024-11-27 RX ORDER — METOPROLOL TARTRATE 50 MG/1
1 TABLET WITH FOOD TABLET, FILM COATED ORAL
Status: ACTIVE | COMMUNITY
Start: 2014-06-30

## 2024-11-27 RX ORDER — AMLODIPINE BESYLATE 2.5 MG/1
1 TABLET TABLET ORAL ONCE A DAY
Status: ACTIVE | COMMUNITY

## 2024-11-27 RX ORDER — PREGABALIN 100 MG/1
1 CAPSULE CAPSULE ORAL TWICE A DAY
Status: ACTIVE | COMMUNITY

## 2025-01-30 ENCOUNTER — OFFICE VISIT (OUTPATIENT)
Dept: URBAN - METROPOLITAN AREA CLINIC 78 | Facility: CLINIC | Age: 75
End: 2025-01-30

## 2025-01-30 VITALS
DIASTOLIC BLOOD PRESSURE: 73 MMHG | BODY MASS INDEX: 28.14 KG/M2 | SYSTOLIC BLOOD PRESSURE: 121 MMHG | HEART RATE: 54 BPM | TEMPERATURE: 98 F | WEIGHT: 190 LBS | HEIGHT: 69 IN

## 2025-01-30 PROBLEM — 282825002: Status: ACTIVE | Noted: 2025-01-30

## 2025-01-30 RX ORDER — APIXABAN 5 MG/1
TABLET, FILM COATED ORAL
Qty: 180 TABLET | Status: ACTIVE | COMMUNITY

## 2025-01-30 RX ORDER — CLOPIDOGREL 75 MG/1
1 TABLET TABLET, FILM COATED ORAL ONCE A DAY
Status: ON HOLD | COMMUNITY

## 2025-01-30 RX ORDER — POLYETHYLENE GLYCOL-3350 AND ELECTROLYTES WITH FLAVOR PACK 240; 5.84; 2.98; 6.72; 22.72 G/278.26G; G/278.26G; G/278.26G; G/278.26G; G/278.26G
4000 ML POWDER, FOR SOLUTION ORAL ONCE
Qty: 1 | Refills: 0 | Status: ACTIVE | COMMUNITY

## 2025-01-30 RX ORDER — MELATONIN/PYRIDOXINE HCL (B6) 5 MG-10 MG
TABLET,IMMED, EXTENDED RELEASE, BIPHASIC ORAL
Status: ACTIVE | COMMUNITY
Start: 2016-12-05

## 2025-01-30 RX ORDER — BIFIDOBACTERIUM LONGUM 10MM CELL
CAPSULE ORAL
Status: ON HOLD | COMMUNITY
Start: 2016-12-05

## 2025-01-30 RX ORDER — METOPROLOL SUCCINATE 25 MG/1
TABLET, EXTENDED RELEASE ORAL
Qty: 30 TABLET | Status: ACTIVE | COMMUNITY

## 2025-01-30 RX ORDER — PREGABALIN 100 MG/1
1 CAPSULE CAPSULE ORAL TWICE A DAY
Status: ACTIVE | COMMUNITY

## 2025-01-30 RX ORDER — ALLOPURINOL 300 MG/1
TABLET ORAL
Status: ON HOLD | COMMUNITY
Start: 2009-03-20

## 2025-01-30 RX ORDER — AMLODIPINE BESYLATE 5 MG/1
TABLET ORAL
Qty: 30 TABLET | Status: ACTIVE | COMMUNITY

## 2025-01-30 RX ORDER — ATORVASTATIN CALCIUM 20 MG/1
AS DIRECTED TABLET, FILM COATED ORAL
Status: ACTIVE | COMMUNITY
Start: 2018-10-04

## 2025-01-30 RX ORDER — AMLODIPINE BESYLATE 2.5 MG/1
1 TABLET TABLET ORAL ONCE A DAY
Status: ACTIVE | COMMUNITY

## 2025-01-30 RX ORDER — METOPROLOL TARTRATE 50 MG/1
1 TABLET WITH FOOD TABLET, FILM COATED ORAL
Status: ACTIVE | COMMUNITY
Start: 2014-06-30

## 2025-01-30 NOTE — HPI-TODAY'S VISIT:
74-year-old male, established patient of Dr. Jean, last seen in January 2024 for continued care on Crohns disease which was previously followed by Dr. Beth.  He had previously been on Pentasa and budesonide however was taken off therapy 4 years ago as colonoscopyat the time  showed resolution of inflammation cecum/TI.  He was seen at [facility] in June 2023 by Dr. Jean for abdominal pain with suggestion of small bowel obstruction.  She placed him on Pentasa 1000 mg twice daily however insurance did not cover this medication for him outpatient.  CT at the time showed terminal ileitis with upstream dilation of the bowels and MRI A/P showed multiple areas of bowel wall thickening predominating over a long segment of distal ileum findings with underlying element of fibrosis. She advised him to follow-up with colorectal surgery due to possible stricture in which she saw Dr. Brown who did not advise for any surgical intervention at the time.   At the time of prior appointment he was on mesalamine 2.4 g instead of Pentasa.  He presented for follow-up after colonoscopy with TI dilation.  He reports symptom improvement since dilation and at the time denied abdominal pain, rectal bleeding.  He was noted to take Imodium as needed for occasional loose stools but denied melena, hematochezia, anorexia, unintentional weight loss, nausea or vomiting.  He would also take Nexium as needed for heartburn but denied dysphagia.   Today he presents for continued care on Crohns dz.  He only takes nexium rarely, no N/V/GERD/dysphagia, abn weight loss.  He denies vision changes, muscles pian, joint pain, or mucosal ulcers.  He denies abdominal pain. He says that if he eats something he is not supposed to, he may have diarrhea.. Sometimes in the morning he may have 3 BMs consisting of  diarrhea. He never sees blood/mucus. He does have a Bm Everyday. Drinking beer can trigger diarrhea. He says that this occurs if he drinks 7-8 beers over 2-3 days. He does take an imodium afther he drinks beers. He has had episodes of incontinence in the past year when he has had beer.  This has only happened about 4-5x in his life, and only occurs when he drinks alcohol.  He denies nocturnal symptoms. He denies fevers or chills.  He takes 4 capsules of metamucil per day with the mesalamine 600mg BID.  He denies CP/SOB. He does not see a pulmonologist.  He was noted to be in A Fib during his colonoscopy, for whcih reason he was snet to [facility] ED. He was referred to his cardiologist (Dr. Otis Farrell) who he saw a few days later. They noted that he was in A fib 50% of the time, rate controlled. They switched him from Plavix to Eliquis.   He will be moving back to [place] and plans on following up with Dr. Faith moving forward.  Summary of prior workup: - Colonoscopy by me in Nov 2024: TI erosions, normal colon mucosa. TI as well as random cololn biopsies were all unremarkable though. - Colonoscopy by me on 9/2023: Mild stenosis of the TI dilated upon passage of the scope. TI biopsies confirmed ileitis. Normal colon mucosa, random biopsies unremarakble.  - Labs on 6/19/23 showed WBC 14.9, Hb 16.9 (felt to be due to hemoconcentration) - CT A/P on June 2023: Multiple areas of wall thickening in the distal ileum, possibly infectious or inflammatory in etiology. Upstream small bowel dilation suggests associated partial small bowel obstruction or ileus. - Colonoscopy by Dr. Nikolay Faith on 3/23/2023: 5 mm HP polyp in the transverse. Internal hemorrhoids. Biopsies showed quiescent IBD, no dysplasia. Poor prep. Repeat colonoscopy advised in 1 year. -Colonoscopy by Dr. Ronni Wilder on 6/13/2018: Normal colonoscopy to the TI. Biopsies showed preserved villous architecture. -Colonoscopy on 3/29/2016: Normal to the TI. -Colonoscopy in 2014: Normal colon. TI biopsies disclosed ulcerative acute and chronic ieitis. -Colonoscopy in 2012 while on Pentasa and daily budesonide 9 mg: Active Crohn's noted with multiple ulcerations measuring 2 to 10 mm in diameter scattered throughout the examined ileum with associated erythema and edema, no evidence of a stricture. No neoplasm. Acute and chronic ieitis wit pseudopyloric metaplasia on biopsies. -Colonoscopy on 2009: 3 mm shallow ulcer in the ileocecal valve. Ileitis. - Small bowel series in 2009: Crohn's disease involving the distal ileum and possibly the cecum. No evidence of bowel obstruction. -Colonoscopy in 2006 showed 2 sigmoid polyps.

## 2025-01-30 NOTE — PHYSICAL EXAM HENT:
Head, normocephalic, atraumatic, Face, Face within normal limits, Ears, External ears within normal limits, Nose/Nasopharynx, External nose normal appearance, nares patent, no nasal discharge, Mouth and Throat, Oral cavity appearance normal, Lips, Appearance normal
airway patent/breath sounds equal/clear to auscultation bilaterally/good air movement/normal/respirations non-labored